# Patient Record
(demographics unavailable — no encounter records)

---

## 2024-10-09 NOTE — PHYSICAL EXAM
[FreeTextEntry1] : NEUROLOGIC EXAM:  MENTAL STATUS: Alert and Oriented to person, place and time. Speech is fluent, without aphasia or dysarthria. Able to name, repeat and follow commands. Behavior and affect appropriate to situation.                        CRANIAL NERVES: CN 2:    Visual fields appear full to confrontation OU CN 3, 4, 6: Extraocular movements are intact. No nystagmus or ophthalmoplegia is evident. Pupils are equally round CN 5:     Facial sensation is intact to light touch in all 3 divisions CN 7:     Facial excursion is full and symmetric bilaterally.  MOTOR: Bilateral upper extremities are antigravity without orbiting or drift. 5/5 all bilateral lower extremities are full range of motion without drift. 5/5 all  SENSORY: Intact to light touch perception in all four extremities EXCEPT L anterior shin down to L dorsal foot surface.  COORD: Finger to nose testing without dysmetria bilaterally.  GAIT: Normal station and gait. [Over the Past 2 Weeks, Have You Felt Down, Depressed, or Hopeless?] : 1.) Over the past 2 weeks, have you felt down, depressed, or hopeless? No [Over the Past 2 Weeks, Have You Felt Little Interest or Pleasure Doing Things?] : 2.) Over the past 2 weeks, have you felt little interest or pleasure doing things? No

## 2024-10-09 NOTE — HISTORY OF PRESENT ILLNESS
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  Patient is here for that exam. She reports no issues with speech, headache, vision changes, hearing changes, dysphagia, dysarthria, LOC, bowel bladder dysfunction or back, neck pain. Denies fever, chills CP, SOB. Her only complaint is paresthesias beginning from her mid shin area on the left leg going down to dorsal surface of the foot plus intermittently in the left third fourth and fifth toe.  She denies any difficulty ambulating.  Following with vascular sx for LE edema and varicose veins.  States if she stands for a long time she does get leg swelling. Denies hx of diabets, ETOH.  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

## 2024-12-04 NOTE — PHYSICAL EXAM
[Normal Rate and Rhythm] : normal rate and rhythm [Ankle Swelling (On Exam)] : present [Ankle Swelling On The Left] : of the left ankle [Ankle Swelling On The Right] : mild [Abdomen Tenderness] : ~T ~M No abdominal tenderness [No Rash or Lesion] : No rash or lesion [Alert] : alert [Oriented to Person] : oriented to person [Oriented to Place] : oriented to place [Calm] : calm [de-identified] : no acute distress noted, [de-identified] : normocephalic [FreeTextEntry1] : RLE palpable pedal pulses LLE DP triphasic signal bilateral varicose veins

## 2024-12-04 NOTE — ASSESSMENT
[FreeTextEntry1] : 50-year-old female with history of left lower extremity acute limb ischemia due to embolic event from infective endocarditis.  She is now 4 months postop from a left femoral embolectomy and 4 compartment fasciotomy of the left lower extremity.  Doing well still complains of some paresthesias to left lower extremity Patient without any claudication ischemic rest pain or ulceration continue daily aspirin In terms of her venous insufficiency and varicose veins we will hold off on any procedures at this time as she has had multiple procedures this year continue compression stocking use  return to vascular clinic in 6 months

## 2024-12-04 NOTE — HISTORY OF PRESENT ILLNESS
[FreeTextEntry1] : 50F with no known PMH presents to [facility] 4/22/24 where she was found with acute b/l frontal lobe infarcts, acute right cerebellar infarcts & pituitary lesion. TTE then revealed MV vegetation and patient was started on IV abx for MV Endocarditis. She was transferred to CT Surgery at [facility] under Dr. Denny on 4/25/24 for further workup. LIV 4/26 confirmed large vegetation on mitral valve consistent with Haemophilus parainfluenza bacteremia for which she was changed to IV Rocephin. Preop Cerebral Angio and Coronary Cath 4/29 were negative. CT Maxillofacial was cleared by Dental service at [facility]. Blood cultures cleared prior to OR. Patient also with LLE embolus to L CFA and profunda causing subacute limb ischemia with numbness to toes [place] 2A. She underwent LLE femoral embolectomy and 4 compartment fasciotomy with subsequent fasciotomy closure. She is now home. Ambulating without major issues but does report persistent LLE swelling. She is on eliquis and aspirin 81mg. [de-identified] : Patient returns for follow-up after left femoral embolectomy and 4 compartment fasciotomy.  She has had numbness and electric shocklike symptoms to the anterior left shin since surgery.  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  She has bilateral varicose veins however they are not bothering her at this time.  She does report edema to the left lower extremity that is improved with compression stocking use it is worse when her leg is in the dependent position.  She has had bilateral GSV vein stripping and stab phlebectomy in the past however she states that the veins have returned and her symptoms have not improved after this procedure

## 2025-04-11 NOTE — HISTORY OF PRESENT ILLNESS
[FreeTextEntry1] : HPI: 50 y/o F with MV endocarditis s/p MVR with Dr. Denny 5/2/24, with course complicated by inotrope requirement with Dobutamine and Left femoral artery embolectomy and four compartment fasciotomy of LLE due to left iliac stenosis with diminished LLE DP pulse. Patient underwent planned fasciotomy closure 5/7/24   Today, April 11, 2025, patient presents for a preoperative cardiac risk assessment prior to retinal procedure.  Patient denies fever or chills.  Patient denies any hospital admissions.  Patient had a colonoscopy which was reportedly was fine. Denies chest pain, SOB, PND, orthopnea, palpitations, lightheadedness, syncope or any signs of bleeding ,compliant with aspirin

## 2025-04-11 NOTE — ASSESSMENT
[FreeTextEntry1] : EKG 4/11/2025- Sinus Rhythm  -Incomplete right bundle branch block./LAFB/ PRWP -Consider Old inferior infarct.  Assessment: 1. Status post MVR for mitral valve endocarditis in May 2024 2. Preop cardiac risk assessment prior to retinal surgery.    Recommendations: Echocardiogram from February 24, 2025 results and comments noted.  Patient did not have any blood cultures are sed rate.  Why she did not do blood culture and sed rate it is unclear.  Currently patient denies any symptoms suggestive of any signs of active infection. Patient is advised to f/u with Dr Ferrari in 3-4 weeks  Patient is low risk for perioperative cardiac events from the retinal surgery. NO ABSOLUTE CONTRAINDICATIONS TO PROCEED WITH THE EYE SURGERY.

## 2025-04-11 NOTE — PHYSICAL EXAM
[Well Developed] : well developed [Well Nourished] : well nourished [No Acute Distress] : no acute distress [Normal Conjunctiva] : normal conjunctiva [Normal Venous Pressure] : normal venous pressure [No Carotid Bruit] : no carotid bruit [Normal S1, S2] : normal S1, S2 [No Murmur] : no murmur [No Rub] : no rub [No Gallop] : no gallop [Clear Lung Fields] : clear lung fields [Good Air Entry] : good air entry [No Respiratory Distress] : no respiratory distress  [Soft] : abdomen soft [Non Tender] : non-tender [Normal Bowel Sounds] : normal bowel sounds [Normal Gait] : normal gait [No Cyanosis] : no cyanosis [No Clubbing] : no clubbing [Edema ___] : edema [unfilled] [No Rash] : no rash [Moves all extremities] : moves all extremities [No Focal Deficits] : no focal deficits [Normal Speech] : normal speech [Alert and Oriented] : alert and oriented [Normal memory] : normal memory [de-identified] : Chaperone: Michelle Turk MA [de-identified] : sternal wound well healed  [de-identified] : mild bilateral mildly pitting leg edema bilaterally

## 2025-04-11 NOTE — CARDIOLOGY SUMMARY
[de-identified] : 2/5/2025 SR 86, low voltage precordial RSR (V1)old anterior /lateral infarct   [de-identified] :  1. Left ventricular cavity is small. Septal motion is abnormal consistent with previous cardiac surgery. Left ventricular systolic function is normal with an ejection fraction of 56 % by Ordaz's method of disks with an ejection fraction visually estimated at 55 to 60 %.  2. Mildly enlarged right ventricular cavity size and probably normal systolic function.  3. Bioprosthetic valve present.Bishop-Martinez Magna valve replacement in the mitral position. Well seated prosthetic mitral valve with normal function. Transvalvular mitral gradients are normal. No prosthetic mitral stenosis. There is No intravalvular mitral regurgitation.  4. Mild tricuspid regurgitation.  5. Estimated pulmonary artery systolic pressure is 17 mmHg, consistent with normal pulmonary artery pressure.  6. No pericardial effusion seen.  1. Left ventricular cavity is normal in size. Left ventricular systolic function is normal with an ejection fraction visually estimated at 55 to 60 %. 2. The left ventricular diastolic function is indeterminate. 3. Normal right ventricular cavity size and normal systolic function. 4. The left atrium is mildly dilated. 5. Echodensity seen on both AMVL and PMVL with eccentric MR. Cannot r/o endocarditis. Suggest LIV for further evaluation if clinically indicated. 6. Moderate mitral regurgitation. The mitral regurgitant jet is eccentrically directed. 7. Pulmonary artery systolic pressure could not be estimated. 8. The inferior vena cava is normal in size measuring 1.28 cm in diameter, (normal <2.1cm) with normal inspiratory collapse (normal >50%) consistent with normal right atrial pressure ( R 3, range 0-5mmHg). 9. Technically difficult image quality. [de-identified] : 4/2024 [facility]: normal coronaries

## 2025-05-07 NOTE — PHYSICAL EXAM
[Over the Past 2 Weeks, Have You Felt Down, Depressed, or Hopeless?] : 1.) Over the past 2 weeks, have you felt down, depressed, or hopeless? No [FreeTextEntry1] : NEUROLOGIC EXAM:  MENTAL STATUS: Alert and Oriented to person, place and time. Speech is fluent, without aphasia or dysarthria. Able to name, repeat and follow commands. Behavior and affect appropriate to situation.                        CRANIAL NERVES: CN 2:    Visual fields appear full to confrontation OU CN 3, 4, 6: Extraocular movements are intact. No nystagmus or ophthalmoplegia is evident. Pupils are equally round CN 5:     Facial sensation is intact to light touch in all 3 divisions CN 7:     Facial excursion is full and symmetric bilaterally.  MOTOR: Bilateral upper extremities are antigravity without orbiting or drift. 5/5 all bilateral lower extremities are full range of motion without drift. 5/5 all  SENSORY: Intact to light touch perception in all four extremities EXCEPT L anterior shin down to L dorsal foot surface.  COORD: Finger to nose testing without dysmetria bilaterally.  GAIT: Normal station and gait. [Over the Past 2 Weeks, Have You Felt Little Interest or Pleasure Doing Things?] : 2.) Over the past 2 weeks, have you felt little interest or pleasure doing things? No

## 2025-05-07 NOTE — REASON FOR VISIT
[Follow-Up: _____] : a [unfilled] follow-up visit [Consultation] : a consultation visit [FreeTextEntry1] : left leg numbness tingling, stroke

## 2025-05-07 NOTE — ASSESSMENT
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 No motor deficits. Suspect compression damage.  - check EMG/NCV - check b12, folate, TSH, B1, B6 for deficiencies - Explained to pt likely recovery course would be unpredictable but possible that EMG/NCV would guide to degree of neuronal injury. - explained that limb edema may worsen her neuropathic symptoms so it is very important to call closely follow-up with vascular and also to be mindful of preventing prolonged standing lower extremity edema, engage in massage and other supportive therapy. - Trial of Gabapentin 100 QHS x 3 days then BID if tolerating.  Extensive education and counseling done as per my usual protocol - relevant to the neurological issues above. Patient's questions and concerns were addressed, she voiced understanding.  Total time spent on the day of the visit, including pre-visit and post-visit time was 50 minutes.

## 2025-05-07 NOTE — HISTORY OF PRESENT ILLNESS
[FreeTextEntry1] : 05/07/2025 >>   NCV was nml in E Nov 2024.    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  Patient is here for that exam. She reports no issues with speech, headache, vision changes, hearing changes, dysphagia, dysarthria, LOC, bowel bladder dysfunction or back, neck pain. Denies fever, chills CP, SOB. Her only complaint is paresthesias beginning from her mid shin area on the left leg going down to dorsal surface of the foot plus intermittently in the left third fourth and fifth toe.  She denies any difficulty ambulating.  Following with vascular sx for LE edema and varicose veins.  States if she stands for a long time she does get leg swelling. Denies hx of diabets, ETOH.  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

## 2025-07-23 NOTE — HISTORY OF PRESENT ILLNESS
[FreeTextEntry1] : 50F with no known PMH [place] [facility] [place] where she was found with acute b/l frontal lobe infarcts, acute right cerebellar infarcts & pituitary lesion. TTE then revealed MV vegetation and patient was started on IV abx for MV Endocarditis. She was transferred to CT Surgery at [facility] under Dr. Denny on 4/25/24 for further workup. LIV 4/26 confirmed large vegetation on mitral valve consistent with Haemophilus parainfluenza bacteremia for which she was changed to IV Rocephin. Preop Cerebral Angio and Coronary Cath 4/29 were negative. CT Maxillofacial was cleared by Dental service at [facility]. Blood cultures cleared prior to OR. Patient also with LLE embolus to L CFA and profunda causing subacute limb ischemia with numbness to toes [place] 2A. She underwent LLE femoral embolectomy and 4 compartment fasciotomy with subsequent fasciotomy closure. She is now home. Ambulating without major issues but does report persistent LLE swelling. She is on eliquis and aspirin 81mg.    12/04/24 Interval History: Patient returns for follow-up after left femoral embolectomy and 4 compartment fasciotomy. She has had numbness and electric shocklike symptoms to the anterior left shin since surgery. 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 She has bilateral varicose veins however they are not bothering her at this time. She does report edema to the left lower extremity that is improved with compression stocking use it is worse when her leg is in the dependent position. She has had bilateral GSV vein stripping and stab phlebectomy in the past however she states that the veins have returned and her symptoms have not improved after this procedure. [de-identified] : 51-year-old female with history of left femoral embolectomy and 4 compartment fasciotomy presents for follow up with concern for LLE DVT due to left calf pain and swelling following trip to [place]. She reports LLE calf pain, swelling and tightening sensation. She wears compression stockings but intermittently as she reports they are uncomfortable. She wears them as much as possible and elevates whenever she can.  She does report numbness and tingling sensation. She has seen neurology in the past for peripheral neuropathy and reports trying medications, but they did not help her symptoms. Denies wounds, claudication, chest pain or shortness of breath.

## 2025-07-23 NOTE — ASSESSMENT
[FreeTextEntry1] : 51-year-old female with history of left lower extremity acute limb ischemia due to embolic event from infective endocarditis s/p left femoral embolectomy and 4 compartment fasciotomy of the left lower extremity with concern for LLE DVT after recent trip to [place]. LLE venous duplex performed in office today demonstrates no evidence of DVT or SVT, Absent GSV SJF to mid-thigh, GSV reflux distal thigh to knee with branches, and AASV reflux with branches.  Plan Duplex findings discussed at length with patient. No evidence of DVT. She does have varicosities bilaterally, but they do not currently bother her. Would like to continue conservative management with compression stockings 20-30 mmHg daily from awakening until bedtime, leg elevation above the level of the heart at rest, frequent ambulation and walk daily for exercise. Discussed that numbness and tingling likely neurogenic follow up with neurologist. Patient will follow up as needed.